# Patient Record
Sex: FEMALE | Race: WHITE
[De-identification: names, ages, dates, MRNs, and addresses within clinical notes are randomized per-mention and may not be internally consistent; named-entity substitution may affect disease eponyms.]

---

## 2018-01-26 ENCOUNTER — HOSPITAL ENCOUNTER (EMERGENCY)
Dept: HOSPITAL 56 - MW.ED | Age: 38
Discharge: HOME | End: 2018-01-26
Payer: COMMERCIAL

## 2018-01-26 VITALS — SYSTOLIC BLOOD PRESSURE: 133 MMHG | DIASTOLIC BLOOD PRESSURE: 85 MMHG

## 2018-01-26 DIAGNOSIS — K02.9: ICD-10-CM

## 2018-01-26 DIAGNOSIS — E03.9: ICD-10-CM

## 2018-01-26 DIAGNOSIS — Z79.899: ICD-10-CM

## 2018-01-26 DIAGNOSIS — F17.210: ICD-10-CM

## 2018-01-26 DIAGNOSIS — J45.909: ICD-10-CM

## 2018-01-26 DIAGNOSIS — K04.7: Primary | ICD-10-CM

## 2018-01-26 PROCEDURE — 99282 EMERGENCY DEPT VISIT SF MDM: CPT

## 2018-01-26 NOTE — EDM.PDOC
ED HPI GENERAL MEDICAL PROBLEM





- General


Chief Complaint: ENT Problem


Stated Complaint: LT LALI OF FACE SWOLLEN


Time Seen by Provider: 01/26/18 14:21


Source of Information: Reports: Patient


History Limitations: Reports: No Limitations





- History of Present Illness


INITIAL COMMENTS - FREE TEXT/NARRATIVE: 


HISTORY AND PHYSICAL:





History of present illness:


Patient is a 37-year-old female who presents to the emergency room with 

complaints of left upper dental pain and soft tissue swelling 3 days. She 

states that she has been taking some leftover amoxicillin for the last 3 days 

and has not improved. She's been using over-the-counter Tylenol and ibuprofen 

without any relief. She is unable to get into a dentist at this time.


Denies any fever, chills, chest pain or shortness of breath. Denies any 

abdominal pain, nausea, vomiting or diarrhea.





Review of systems: 


As per history of present illness and below otherwise all systems reviewed and 

negative.





Past medical history: 


As per history of present illness and as reviewed below otherwise 

noncontributory.





Surgical history: 


As per history of present illness and as reviewed below otherwise 

noncontributory.





Social history: 


No reported history of drug or alcohol abuse.





Family history: 


As per history of present illness and as reviewed below otherwise 

noncontributory.





Physical exam:


Neuro: Well-developed and well-nourished 37-year-old female. Alert and 

oriented. Nontoxic appearing and in no acute distress.


HEENT: Atraumatic, normocephalic, pupils reactive, negative for conjunctival 

pallor or scleral icterus, mucous membranes moist, throat clear without 

erythema or exudate, neck supple, no lymphadenopathy, nontender, trachea 

midline. Dental caries with soft tissue swelling and erythema at #12-13. No 

drainage or exudate noted.


Lungs: Clear to auscultation, breath sounds equal bilaterally.


Heart: S1S2, regular rate and rythm


Abdomen: Soft, nondistended, nontender


Pelvis: Stable nontender.


Genitourinary: Deferred.


Rectal: Deferred.


Extremities: Atraumatic, moves all perself, Neurovascular unremarkable.


Neuro: Awake, alert, oriented. Cranial nerves II through XII unremarkable. 

Cerebellum unremarkable. Motor and sensory unremarkable throughout. Exam 

nonfocal.





We'll prescribe the patient Augmentin 875 twice a day 10 days. Tramadol (#20) 

one tab every or to 6 hours as needed. Follow-up with a dentist. Patient voices 

understanding and is agreeable to plan of care. She denies any further 

questions at this time.





Diagnostics:


[]





Therapeutics:


Hurricane spray/viscous lidocaine (Dental Balls)





Impression: 


Dental abscess


Dental caries





Plan:


1. Please take the antibiotic as prescribed. The medication for pain, Ultram, 

may cause drowsiness so do not take it while driving or needing to be 

functioning at work. You may use the dental balls have been given to you during 

the daytime along with Tylenol/ibuprofen.


2. Please follow-up with the dentist as we discussed.


3. Return to the ED as needed and as discussed.





Definitive disposition and diagnosis as appropriate pending reevaluation and 

review of above.





Duration: Day(s):


Location: Reports: Face


  ** left lower


Pain Score (Numeric/FACES): 7





- Related Data


 Allergies











Allergy/AdvReac Type Severity Reaction Status Date / Time


 


No Known Allergies Allergy   Verified 01/26/18 14:23











Home Meds: 


 Home Meds





Levothyroxine 50 mcg PO DAILY 07/19/15 [History]


Montelukast [Singulair] 10 mg PO DAILY 07/19/15 [History]


metFORMIN [Glucophage] 1,000 mg PO BID 07/19/15 [History]


Prenatal #103/Iron Fumarate/Fa [ Prenatal] 1 tab PO DAILY 05/24/16 [

History]


Albuterol Sulfate 1 unit NEB ASDIRECTED PRN 08/01/17 [History]


Albuterol Sulfate [Proair Hfa] 2 puff INH ASDIRECTED PRN 08/01/17 [History]


Fexofenadine HCl [Allegra Allergy] 1 tab PO ASDIRECTED PRN 08/01/17 [History]


diphenhydrAMINE [Benadryl] 25 ng PO BEDTIME PRN 08/01/17 [History]











Past Medical History


HEENT History: Reports: None


Cardiovascular History: Reports: Heart Murmur


Respiratory History: Reports: Asthma


Gastrointestinal History: Reports: GERD


Genitourinary History: Reports: None


Other Genitourinary History: hx of kidney stone


OB/GYN History: Reports: Polycystic Ovaries


Musculoskeletal History: Reports: Fracture


Other Musculoskeletal History: hx of fx left foot


Neurological History: Reports: None


Psychiatric History: Reports: None


Endocrine/Metabolic History: Reports: Hypothyroidism, Obesity/BMI 30+


Hematologic History: Reports: None


Immunologic History: Reports: None


Oncologic (Cancer) History: Reports: None


Dermatologic History: Reports: None





- Past Surgical History


Head Surgeries/Procedures: Reports: None


Cardiovascular Surgical History: Reports: Other (See Below)


Other Cardiovascular Surgeries/Procedures: angiogram at age 6 months and 5 years


Respiratory Surgical History: Reports: None


Endocrine Surgical History: Reports: None


Neurological Surgical History: Reports: None


Musculoskeletal Surgical History: Reports: None


Oncologic Surgical History: Reports: None


Dermatological Surgical History: Reports: None





Social & Family History





- Tobacco Use


Smoking Status *Q: Current Every Day Smoker


Years of Tobacco use: 15


Packs/Tins Daily: 0.5





- Recreational Drug Use


Recreational Drug Use: No


Drug Use in Last 12 Months: No





- Living Situation & Occupation


Living situation: Reports: with Significant Other





ED ROS ENT





- Review of Systems


Review Of Systems: ROS reveals no pertinent complaints other than HPI.





ED EXAM, ENT





- Physical Exam


Exam: See Below (See dictation)





Course





- Vital Signs


Last Recorded V/S: 





 Last Vital Signs











Temp  97.6 F   01/26/18 14:23


 


Pulse  106 H  01/26/18 14:23


 


Resp  18   01/26/18 14:23


 


BP  133/85   01/26/18 14:23


 


Pulse Ox  97   01/26/18 14:23














Departure





- Departure


Time of Disposition: 14:41


Disposition: Home, Self-Care 01


Clinical Impression: 


 Dental abscess, Dental caries








- Discharge Information


Referrals: 


PCP,None [Primary Care Provider] - 


Additional Instructions: 


My general discharge


The following information is given to patients seen in the emergency department 

who are being discharged to home. This information is to outline your options 

for follow-up care. We provide all patients seen in our emergency department 

with a follow-up referral.





The need for follow-up, as well as the timing and circumstances, are variable 

depending upon the specifics of your emergency department visit.





If you don't have a primary care physician on staff, we will provide you with a 

referral. We always advise you to contact your personal physician following an 

emergency department visit to inform them of the circumstance of the visit and 

for follow-up with them and/or the need for any referrals to a consulting 

specialist.





The emergency department will also refer you to a specialist when appropriate. 

This referral assures that you have the opportunity for follow-up care with a 

specialist. All of these measure are taken in an effort to provide you with 

optimal care, which includes your follow-up.





Under all circumstances we always encourage you to contact your private 

physician who remains a resource for coordinating your care. When calling for 

follow-up care, please make the office aware that this follow-up is from your 

recent emergency room visit. If for any reason you are refused follow-up, 

please contact the [facility] Emergency 

Department at [phone] and asked to speak to the emergency department 

charge nurse.





[facility]


Primary Care


[facility] [place] [id number]


Phone: [phone]


Fax: [phone]





1. Please take the antibiotic as prescribed. The medication for pain, Ultram, 

may cause drowsiness so do not take it while driving or needing to be 

functioning at work. You may use the dental balls have been given to you during 

the daytime along with Tylenol/ibuprofen.


2. Please follow-up with the dentist as we discussed.


3. Return to the ED as needed and as discussed.

## 2018-01-27 ENCOUNTER — HOSPITAL ENCOUNTER (EMERGENCY)
Dept: HOSPITAL 56 - MW.ED | Age: 38
Discharge: HOME | End: 2018-01-27
Payer: COMMERCIAL

## 2018-01-27 VITALS — DIASTOLIC BLOOD PRESSURE: 81 MMHG | SYSTOLIC BLOOD PRESSURE: 126 MMHG

## 2018-01-27 DIAGNOSIS — E03.9: ICD-10-CM

## 2018-01-27 DIAGNOSIS — Z79.899: ICD-10-CM

## 2018-01-27 DIAGNOSIS — K02.9: ICD-10-CM

## 2018-01-27 DIAGNOSIS — F17.210: ICD-10-CM

## 2018-01-27 DIAGNOSIS — Z79.84: ICD-10-CM

## 2018-01-27 DIAGNOSIS — K04.7: Primary | ICD-10-CM

## 2018-01-27 DIAGNOSIS — J45.909: ICD-10-CM

## 2018-01-27 PROCEDURE — 99282 EMERGENCY DEPT VISIT SF MDM: CPT

## 2018-01-27 PROCEDURE — 96372 THER/PROPH/DIAG INJ SC/IM: CPT

## 2018-01-27 NOTE — EDM.PDOC
ED HPI GENERAL MEDICAL PROBLEM





- General


Chief Complaint: ENT Problem


Stated Complaint: L SIDE SWOLLEN FACE


Time Seen by Provider: 01/27/18 11:51





- History of Present Illness


INITIAL COMMENTS - FREE TEXT/NARRATIVE: 





HISTORY AND PHYSICAL:





History of present illness:


Patient is a 37-year-old female was seen yesterday with dental abscess who 

presents now with reevaluation for increased swelling and no fever chills 

vomiting or other concern she was prescribed Augmentin and states she's been 

compliant she's had one dose





Review of systems: 


As per history of present illness and below otherwise all systems reviewed and 

negative.





Past medical history: 


As per history of present illness and as reviewed below otherwise 

noncontributory.





Surgical history: 


As per history of present illness and as reviewed below otherwise 

noncontributory.





Social history: 


No reported history of drug or alcohol abuse.





Family history: 


As per history of present illness and as reviewed below otherwise 

noncontributory.





Physical exam:


HEENT: Atraumatic, normocephalic, pupils reactive, negative for conjunctival 

pallor or scleral icterus, mucous membranes moist, throat clear, neck supple, 

nontender, trachea midline. Patient has multiple dental caries and generally 

poor dentition she also has dental fracture secondary to dental caries she has 

left facial swelling and left submandibular adenopathy noted


Lungs: Clear to auscultation, breath sounds equal bilaterally, chest nontender.


Heart: S1S2, regular, negative for clicks, rubs, or JVD.


Abdomen: Soft, nondistended, nontender. Negative for masses or 

hepatosplenomegaly. Negative for costovertebral tenderness.


Pelvis: Stable nontender.


Genitourinary: Deferred.


Rectal: Deferred.


Extremities: Atraumatic, negative for cords or calf pain. Neurovascular 

unremarkable.


Neuro: Awake, alert, oriented. Cranial nerves II through XII unremarkable. 

Cerebellum unremarkable. Motor and sensory unremarkable throughout. Exam 

nonfocal.





Diagnostics:


None





Therapeutics:


Rocephin 1 g IM





Impression: 


#1 dental abscess





Definitive disposition and diagnosis as appropriate pending reevaluation and 

review of above.





- Related Data


 Allergies











Allergy/AdvReac Type Severity Reaction Status Date / Time


 


No Known Allergies Allergy   Verified 01/26/18 14:23











Home Meds: 


 Home Meds





Levothyroxine 50 mcg PO DAILY 07/19/15 [History]


Montelukast [Singulair] 10 mg PO DAILY 07/19/15 [History]


metFORMIN [Glucophage] 1,000 mg PO BID 07/19/15 [History]


Prenatal #103/Iron Fumarate/Fa [ Prenatal] 1 tab PO DAILY 05/24/16 [

History]


Albuterol Sulfate 1 unit NEB ASDIRECTED PRN 08/01/17 [History]


Albuterol Sulfate [Proair Hfa] 2 puff INH ASDIRECTED PRN 08/01/17 [History]


Fexofenadine HCl [Allegra Allergy] 1 tab PO ASDIRECTED PRN 08/01/17 [History]


diphenhydrAMINE [Benadryl] 25 ng PO BEDTIME PRN 08/01/17 [History]











Past Medical History


HEENT History: Reports: None


Cardiovascular History: Reports: Heart Murmur


Respiratory History: Reports: Asthma


Gastrointestinal History: Reports: GERD


Genitourinary History: Reports: None


Other Genitourinary History: hx of kidney stone


OB/GYN History: Reports: Polycystic Ovaries


Musculoskeletal History: Reports: Fracture


Other Musculoskeletal History: hx of fx left foot


Neurological History: Reports: None


Psychiatric History: Reports: None


Endocrine/Metabolic History: Reports: Hypothyroidism, Obesity/BMI 30+


Hematologic History: Reports: None


Immunologic History: Reports: None


Oncologic (Cancer) History: Reports: None


Dermatologic History: Reports: None





- Past Surgical History


Head Surgeries/Procedures: Reports: None


Cardiovascular Surgical History: Reports: Other (See Below)


Other Cardiovascular Surgeries/Procedures: angiogram at age 6 months and 5 years


Respiratory Surgical History: Reports: None


Endocrine Surgical History: Reports: None


Neurological Surgical History: Reports: None


Musculoskeletal Surgical History: Reports: None


Oncologic Surgical History: Reports: None


Dermatological Surgical History: Reports: None





Social & Family History





- Tobacco Use


Smoking Status *Q: Current Every Day Smoker


Years of Tobacco use: 15


Packs/Tins Daily: 0.5





- Recreational Drug Use


Recreational Drug Use: No


Drug Use in Last 12 Months: No





- Living Situation & Occupation


Living situation: Reports: with Significant Other





ED ROS GENERAL





- Review of Systems


Review Of Systems: ROS reveals no pertinent complaints other than HPI.





ED EXAM, GENERAL





- Physical Exam


Exam: See Below (Dictation)





Course





- Orders/Labs/Meds


Orders: 





 Active Orders 24 hr











 Category Date Time Status


 


 cefTRIAXone [Rocephin] 1,000 mg Med  01/27/18 11:53 Ordered





 Lidocaine 1% [Xylocaine-MPF 1%] 4 ml   





 IM ONETIME   








 Medication Orders





Ceftriaxone Sodium 1,000 mg/ (Lidocaine HCl)  4 mls @ 4 mls/sec IM ONETIME ONE


   Stop: 01/27/18 11:54








Meds: 





Medications











Generic Name Dose Route Start Last Admin





  Trade Name Anita  PRN Reason Stop Dose Admin


 


Ceftriaxone Sodium 1,000 mg/  4 mls @ 4 mls/sec  01/27/18 11:53  





  Lidocaine HCl  IM  01/27/18 11:54  





  ONETIME ONE   














Departure





- Departure


Time of Disposition: 11:55


Disposition: Home, Self-Care 01


Condition: Good


Clinical Impression: 


 Dental abscess, Dental caries








- Discharge Information


Referrals: 


Eloy Landa MD [Primary Care Provider] - 


Additional Instructions: 








The following information is given to patients seen in the emergency department 

who are being discharged to home. This information is to outline your options 

for follow-up care. We provide all patients seen in our emergency department 

with a follow-up referral.





The need for follow-up, as well as the timing and circumstances, are variable 

depending upon the specifics of your emergency department visit.





If you don't have a primary care physician on staff, we will provide you with a 

referral. We always advise you to contact your personal physician following an 

emergency department visit to inform them of the circumstance of the visit and 

for follow-up with them and/or the need for any referrals to a consulting 

specialist.





The emergency department will also refer you to a specialist when appropriate. 

This referral assures that you have the opportunity for followup care with a 

specialist. All of these measure are taken in an effort to provide you with 

optimal care, which includes your followup.





Under all circumstances we always encourage you to contact your private 

physician who remains a resource for coordinating  your care. When calling for 

followup care, please make the office aware that this follow-up is from your 

recent emergency room visit. If for any reason you are refused follow-up, 

please contact the [facility] emergency department at [phone] 

and asked to speak to the emergency department charge nurse.


























Continue Augmentin as prescribed Motrin as directed keep dental appointment as 

discussed return as needed as discussed]





- My Orders


Last 24 Hours: 





My Active Orders





01/27/18 11:53


cefTRIAXone [Rocephin] 1,000 mg   Lidocaine 1% [Xylocaine-MPF 1%] 4 ml IM 

ONETIME 














- Assessment/Plan


Last 24 Hours: 





My Active Orders





01/27/18 11:53


cefTRIAXone [Rocephin] 1,000 mg   Lidocaine 1% [Xylocaine-MPF 1%] 4 ml IM 

ONETIME

## 2021-06-21 NOTE — PCM.OPNOTE
- General Post-Op/Procedure Note


Date of Surgery/Procedure: 06/21/21


Operative Procedure(s): Incision and drainage right breast abscess


Findings: 





2 x 1.5 x 1 cm abscess under the right nipple. Small opening at 12 oclock 

position on nipple


Pre Op Diagnosis: Right breast abscess


Post-Op Diagnosis: same


Anesthesia Technique: General LMA, Local


Primary Surgeon: Liyah Dotson


Condition: Stable

## 2021-06-21 NOTE — [id number]
Post Anesthesia Note





- EVALUATION WITHIN 48HRS OF ANESTHETIC


Vital Signs in Normal Range: Yes


Patient Participated in Evaluation: Yes


Respiratory Function Stable: Yes


Airway Patent: Yes


Cardiovascular Function Stable: Yes


Hydration Status Stable: Yes


Pain Control Satisfactory: Yes


Nausea and Vomiting Control Satisfactory: Yes


Mental Status Recovered: Yes


Vital Signs: 


                                Last Vital Signs











Temp  96.4 F L  06/21/21 11:50


 


Pulse  93   06/21/21 12:57


 


Resp  12   06/21/21 12:57


 


BP  107/76   06/21/21 12:57


 


Pulse Ox  93 L  06/21/21 12:57

## 2021-06-21 NOTE — US
CLINICAL HISTORY: :



Increased pain and erythema in the right breast. Patient had diagnostic 

mammogram and ultrasound evaluation 06/18/2021.



COMPARISON:



06/18/2021



TECHNIQUE:



Real-time ultrasound imaging of the right breast with imaging 

documentation.  Scanning was performed by the technologist. The radiologist 

was not present for imaging. 



FINDINGS:



Target ultrasound in the area of symptomatology adjacent to the right 

nipple demonstrates an irregular heterogeneous fluid collection located 

superficially adjacent to the nipple measuring 2.1 x 1.1 x 1.4 cm. This is 

avascular. There is adjacent hyperemia. There does appear to be overlying 

skin thickening.



No definite solid mass identified. 



IMPRESSION:



 Findings are suspicious for a small superficial breast abscess. This 

appears increased from prior ultrasound. 



RECOMMENDATIONS:



1. Recommend clinical follow-up of probable right breast abscess. 

Ultrasound-guided needle aspiration should be considered. 



2. Recommend follow-up ultrasound in 4-6 weeks after treatment and symptom 

resolution to document resolution of ultrasound findings. 



BI-RADS:  3: Probably benign.



Dictated by Corky Dietz MD @ 6/21/2021 10:03:37 AM



(Electronically Signed)

## 2021-06-21 NOTE — EDM.PDOC
ED HPI GENERAL MEDICAL PROBLEM





- General


Chief Complaint: Skin Complaint


Stated Complaint: MASS IN RIGHT BREAST, INFECTION


Time Seen by Provider: 06/21/21 07:10





- History of Present Illness


INITIAL COMMENTS - FREE TEXT/NARRATIVE: 





CHIEF COMPLAINT(S): Right breast pain








HISTORY OF PRESENT ILLNESS: This is a 41-year-old woman with a past medical 

history of asthma who comes to the emergency department with a chief complaint 

of right breast pain.  The patient states that approximately 4 days ago she saw 

her primary care physician and they obtained an ultrasound and mammogram and 

told her that she had an infection.  They prescribed her Bactrim.  She states 

that however over that time she feels like the area is becoming more inflamed it

is tender and throbbing rated 9 out of 10.  She states there is mild redness of 

the skin.  She states that the antibiotics do not seem to be helping.  She 

denies any fever, chills, chest pain, shortness of breath, abdominal pain, 

nausea or vomiting.  She states that her mammogram was normal.  She has not had 

the Covid vaccine and does not know if she has had any Covid exposures.  She 

does not have any Covid symptoms.  She denies any nipple drainage.  She denies 

any family history of breast cancer.  She states that she has been trying to ice

the area which has not helped the pain.  She also tried diclofenac with some 

mild improvement.  Aggravating symptoms include touching it.  Otherwise she 

denies any other symptoms





 


REVIEW OF SYSTEMS: 





Constitutional: Denies fever, chills.


Eyes: Denies eye pain


Ears, Nose, Mouth, & Throat: Denies earache


Chest: Right breast pain


Cardiovascular: Denies chest pain


Respiratory: Denies shortness of breath


Gastrointestinal: Denies Nausea, vomiting, diarrhea, hematochezia.


Genitourinary: Denies hematuria


Skin:Denies a rash


MSK: Denies joint pain


Neurological: Denies blurred vision


Psychiatric: Denies depression








PAST MEDICAL HISTORY: As per history of present illness and as reviewed below 

otherwise noncontributory.





SURGICAL HISTORY: As per history of present illness and as reviewed below 

otherwise noncontributory.








SOCIAL HISTORY: As per history of present illness and as reviewed below 

otherwise noncontributory.





FAMILY HISTORY: As per history of present illness and as reviewed below 

otherwise noncontributory.








EXAMINATION OF ORGAN SYSTEMS/BODY AREAS: 





Constitutional: Blood pressure is 144/99, heart rate 104, respiratory rate 18 

with an oxygen saturation 96% on room air.  Temperature 36.0


General: Overall well-appearing woman who is in no acute distress


Psychiatric: Appropriate mood and affect.


Eyes: No scleral icterus or conjunctival erythema


ENMT: Moist mucous membranes. No pharyngeal erythema


Breast: RN marquise Cristela was not present.  Right breast has an area 

subareolar that is swollen and quite large.  This area is quite tender.  There 

is overlying erythema.  There is some retraction of the nipple.


Cardiovascular: Regular, rate, and rhythm.  No gallops, murmurs, or rubs.  

Bilateral upper extremity pulses symmetric and intact.  No peripheral edema.  No

JVD.


Respiratory: Lungs clear to auscultation bilaterally.  No wheezes, rales, or 

rhonchi.


Gastrointestinal: Soft, non-tender, non-distended.  Normoactive bowel sounds


Genitourinary: No suprapubic tenderness


Musculoskeletal: Normal range of motion.


Skin: No lesions or abrasions.


Neurological:     Alert, GCS 15








MEDICAL DECISION MAKING AND COURSE IN THE ED WITH INTERPRETATION/REVIEW OF 

DIAGNOSTIC STUDIES: This is a 40-year-old woman with a past medical history of 

tobacco use disorder and asthma who comes to the emergency department with 

worsening right fascia pain with evidence of a subareolar mass with minimal 

fluctuance which is tender to palpation with overlying skin changes.  At this 

time the patient's temperature is 36 and her heart rate is 104.  Will obtain a 

septic work-up.  We will provide the patient with 1 L of lactated Ringer's bolus

and await lactic acid for further fluid administration.  At this time 

antibiotics are going to be held as I do believe this is secondary to a abscess.

 We will provide the patient with 4 mg of IV morphine for pain relief.  Will 

obtain a breast ultrasound.





Laboratory: CBC reveals elevated hemoglobin at 6.2 and hematocrit of 46.6 

otherwise unremarkable.  INR is normal.  CMP reveals hyperglycemia otherwise 

unremarkable.  Lactic acid is 1.0.  hCG is negative.  Covid is negative.





After labs the patient does not meet sepsis criteria therefore no further fluid 

bolus will be administered.  The patient's heart rate did improve.  We will hold

off on antibiotic administration again as I do believe this is an abscess and 

treatment for an abscess is incision and drainage.





The radiological images were viewed by myself along with reading the report from

the radiologist.


Breast ultrasound reveals a 2.1 x 1.1 x 1.4 cm abscess just adjacent to the 

right nipple.





After imaging I did discuss results with the patient.  I did contact Dr. Alvarado 

who did come and evaluate the patient.  She recommends incision and drainage in 

the operating room.  Therefore the patient was started on maintenance fluids.  I

did provide the patient with additional pain medications including 1 mg of 

Dilaudid.





DISPOSITION: The patient was transferred to the operating room for incision and 

drainage 





CONDITION: Fair





PROCEDURES: None





FINAL IMPRESSION(S)/DIAGNOSES: 





1.  Acute right breast abscess





 





Farrukh Robison M.D.


  ** right breast


Pain Score (Numeric/FACES): 9





- Related Data


                                    Allergies











Allergy/AdvReac Type Severity Reaction Status Date / Time


 


No Known Allergies Allergy   Verified 06/21/21 06:58











Home Meds: 


                                    Home Meds





Montelukast [Singulair] 10 mg PO DAILY 07/19/15 [History]


metFORMIN [Glucophage] 1,000 mg PO BID 07/19/15 [History]


Albuterol Sulfate [Proair Hfa] 2 puff INH ASDIRECTED PRN 08/01/17 [History]


Fexofenadine HCl [Allegra Allergy] 1 tab PO ASDIRECTED PRN 08/01/17 [History]


diphenhydrAMINE [Benadryl] 25 ng PO BEDTIME PRN 08/01/17 [History]


cefUROXime axetiL [Ceftin] 250 mg PO BID #20 tablet 11/16/18 [Rx]


methylPREDNISolone [Medrol] 4 mg PO ASDIRECTED #1 dosepk 11/16/18 [Rx]











Past Medical History


HEENT History: Reports: None


Cardiovascular History: Reports: Heart Murmur


Respiratory History: Reports: Asthma


Gastrointestinal History: Reports: GERD


Genitourinary History: Reports: None, Renal Calculus


Other Genitourinary History: hx of kidney stone


OB/GYN History: Reports: Polycystic Ovaries, Other (See Below)


Other OB/GYN History: tubal pregnancy


Musculoskeletal History: Reports: Back Pain, Chronic, Fracture, Other (See 

Below)


Other Musculoskeletal History: hx of fx left foot; Scheuermann's Disease


Neurological History: Reports: None


Psychiatric History: Reports: None


Endocrine/Metabolic History: Reports: Hypothyroidism, Obesity/BMI 30+


Insulin Pump Model and : None


Hematologic History: Reports: None


Immunologic History: Reports: None


Oncologic (Cancer) History: Reports: None


Dermatologic History: Reports: None





- Infectious Disease History


Infectious Disease History: Reports: None





- Past Surgical History


Head Surgeries/Procedures: Reports: None


HEENT Surgical History: Reports: Tonsillectomy


Cardiovascular Surgical History: Reports: Other (See Below)


Other Cardiovascular Surgeries/Procedures: Angiogram


Respiratory Surgical History: Reports: None


GI Surgical History: Reports: None


Female  Surgical History: Reports: D&C


Endocrine Surgical History: Reports: None


Neurological Surgical History: Reports: None


Musculoskeletal Surgical History: Reports: None


Oncologic Surgical History: Reports: None


Dermatological Surgical History: Reports: None





Social & Family History





- Family History


Family Medical History: No Pertinent Family History





- Caffeine Use


Caffeine Use: Reports: Coffee





- Recreational Drug Use


Recreational Drug Use: No





- Living Situation & Occupation


Living situation: Reports: with Significant Other





ED ROS GENERAL





- Review of Systems


Review Of Systems: See Below





ED EXAM, SKIN/RASH


Exam: See Below





Course





- Vital Signs


Last Recorded V/S: 


                                Last Vital Signs











Temp  36.0 C L  06/21/21 06:55


 


Pulse  92   06/21/21 11:33


 


Resp  16   06/21/21 11:33


 


BP  125/88   06/21/21 11:33


 


Pulse Ox  95   06/21/21 11:33














- Orders/Labs/Meds


Orders: 


                               Active Orders 24 hr











 Category Date Time Status


 


 Admission Status [Patient Status] [ADT] Stat ADT  06/21/21 11:17 Active


 


 CULTURE BLOOD [BC] Stat Lab  06/21/21 07:27 Received


 


 CULTURE BLOOD [BC] Stat Lab  06/21/21 07:57 Received


 


 Lactated Ringers [Ringers, Lactated] 1,000 ml Med  06/21/21 11:30 Active





 IV ASDIRECTED   


 


 Blood Culture x2 Reflex Set [OM.PC] Stat Oth  06/21/21 07:20 Ordered








                                Medication Orders





Lactated Ringer's (Ringers, Lactated)  1,000 mls @ 150 mls/hr IV ASDIRECTED CHANNING


   Last Admin: 06/21/21 11:29  Dose: 150 mls/hr


   Documented by: JUSTINO








Labs: 


                                Laboratory Tests











  06/21/21 06/21/21 06/21/21 Range/Units





  07:27 07:27 07:27 


 


WBC  10.41    (4.0-11.0)  K/uL


 


RBC  5.13    (4.30-5.90)  M/uL


 


Hgb  16.2 H    (12.0-16.0)  g/dL


 


Hct  46.6 H    (36.0-46.0)  %


 


MCV  90.8    (80.0-98.0)  fL


 


MCH  31.6    (27.0-32.0)  pg


 


MCHC  34.8    (31.0-37.0)  g/dL


 


RDW Std Deviation  43.6    (28.0-62.0)  fl


 


RDW Coeff of Ricarda  13    (11.0-15.0)  %


 


Plt Count  268    (150-400)  K/uL


 


MPV  9.00    (7.40-12.00)  fL


 


Neut % (Auto)  67.4    (48.0-80.0)  %


 


Lymph % (Auto)  19.3    (16.0-40.0)  %


 


Mono % (Auto)  7.4    (0.0-15.0)  %


 


Eos % (Auto)  5.6    (0.0-7.0)  %


 


Baso % (Auto)  0.3    (0.0-1.5)  %


 


Neut # (Auto)  7.0 H    (1.4-5.7)  K/uL


 


Lymph # (Auto)  2.0    (0.6-2.4)  K/uL


 


Mono # (Auto)  0.8    (0.0-0.8)  K/uL


 


Eos # (Auto)  0.6    (0.0-0.7)  K/uL


 


Baso # (Auto)  0.0    (0.0-0.1)  K/uL


 


Nucleated RBC %  0.0    /100WBC


 


Nucleated RBCs #  0    K/uL


 


INR   1.00   


 


Sodium    137  (136-145)  mmol/L


 


Potassium    4.4  (3.5-5.1)  mmol/L


 


Chloride    102  ()  mmol/L


 


Carbon Dioxide    24.1  (21.0-32.0)  mmol/L


 


BUN    16  (7.0-18.0)  mg/dL


 


Creatinine    0.9  (0.6-1.0)  mg/dL


 


Est Cr Clr Drug Dosing    72.53  mL/min


 


Estimated GFR (MDRD)    > 60.0  ml/min


 


Glucose    112 H  ()  mg/dL


 


Lactic Acid     (0.4-2.0)  mmol/L


 


Calcium    8.9  (8.5-10.1)  mg/dL


 


Magnesium    2.1  (1.8-2.4)  mg/dL


 


Total Bilirubin    0.5  (0.2-1.0)  mg/dL


 


AST    11 L  (15-37)  IU/L


 


ALT    21  (14-63)  IU/L


 


Alkaline Phosphatase    57  ()  U/L


 


Total Protein    7.6  (6.4-8.2)  g/dL


 


Albumin    3.7  (3.4-5.0)  g/dL


 


Globulin    3.9  (2.6-4.0)  g/dL


 


Albumin/Globulin Ratio    0.9  (0.9-1.6)  


 


HCG, Qual     (NEG)  


 


SARS-CoV-2 RNA (MARY)     (NEGATIVE)  














  06/21/21 06/21/21 06/21/21 Range/Units





  07:27 07:27 07:40 


 


WBC     (4.0-11.0)  K/uL


 


RBC     (4.30-5.90)  M/uL


 


Hgb     (12.0-16.0)  g/dL


 


Hct     (36.0-46.0)  %


 


MCV     (80.0-98.0)  fL


 


MCH     (27.0-32.0)  pg


 


MCHC     (31.0-37.0)  g/dL


 


RDW Std Deviation     (28.0-62.0)  fl


 


RDW Coeff of Ricarda     (11.0-15.0)  %


 


Plt Count     (150-400)  K/uL


 


MPV     (7.40-12.00)  fL


 


Neut % (Auto)     (48.0-80.0)  %


 


Lymph % (Auto)     (16.0-40.0)  %


 


Mono % (Auto)     (0.0-15.0)  %


 


Eos % (Auto)     (0.0-7.0)  %


 


Baso % (Auto)     (0.0-1.5)  %


 


Neut # (Auto)     (1.4-5.7)  K/uL


 


Lymph # (Auto)     (0.6-2.4)  K/uL


 


Mono # (Auto)     (0.0-0.8)  K/uL


 


Eos # (Auto)     (0.0-0.7)  K/uL


 


Baso # (Auto)     (0.0-0.1)  K/uL


 


Nucleated RBC %     /100WBC


 


Nucleated RBCs #     K/uL


 


INR     


 


Sodium     (136-145)  mmol/L


 


Potassium     (3.5-5.1)  mmol/L


 


Chloride     ()  mmol/L


 


Carbon Dioxide     (21.0-32.0)  mmol/L


 


BUN     (7.0-18.0)  mg/dL


 


Creatinine     (0.6-1.0)  mg/dL


 


Est Cr Clr Drug Dosing     mL/min


 


Estimated GFR (MDRD)     ml/min


 


Glucose     ()  mg/dL


 


Lactic Acid  1.0    (0.4-2.0)  mmol/L


 


Calcium     (8.5-10.1)  mg/dL


 


Magnesium     (1.8-2.4)  mg/dL


 


Total Bilirubin     (0.2-1.0)  mg/dL


 


AST     (15-37)  IU/L


 


ALT     (14-63)  IU/L


 


Alkaline Phosphatase     ()  U/L


 


Total Protein     (6.4-8.2)  g/dL


 


Albumin     (3.4-5.0)  g/dL


 


Globulin     (2.6-4.0)  g/dL


 


Albumin/Globulin Ratio     (0.9-1.6)  


 


HCG, Qual   NEGATIVE   (NEG)  


 


SARS-CoV-2 RNA (MARY)    NEGATIVE  (NEGATIVE)  











Meds: 


Medications











Generic Name Dose Route Start Last Admin





  Trade Name Freq  PRN Reason Stop Dose Admin


 


Lactated Ringer's  1,000 mls @ 150 mls/hr  06/21/21 11:30  06/21/21 11:29





  Ringers, Lactated  IV   150 mls/hr





  ASDIRECTED CHANNING   Administration














Discontinued Medications














Generic Name Dose Route Start Last Admin





  Trade Name Jasonq  PRN Reason Stop Dose Admin


 


Hydromorphone HCl  1 mg  06/21/21 09:50  06/21/21 09:54





  Hydromorphone 1 Mg/Ml Syringe  IVPUSH  06/21/21 09:51  1 mg





  ONETIME ONE   Administration


 


Lactated Ringer's  1,000 mls @ 999 mls/hr  06/21/21 07:19  06/21/21 07:37





  Ringers, Lactated  IV  06/21/21 08:19  999 mls/hr





  .BOLUS ONE   Administration


 


Morphine Sulfate  4 mg  06/21/21 07:19  06/21/21 07:37





  Morphine 4 Mg/Ml Syringe  IVPUSH  06/21/21 07:20  4 mg





  ONETIME ONE   Administration














Departure





- Departure


Time of Disposition: 11:17


Disposition: Still A Patient 30


Condition: Fair


Clinical Impression: 


 Breast abscess








- Discharge Information


Referrals: 


Zahra Booth DO [Primary Care Provider] - 


Forms:  ED Department Discharge





Sepsis Event Note (ED)





- Evaluation


Sepsis Screening Result: No Definite Risk





- Focused Exam


Vital Signs: 


                                   Vital Signs











  Temp Pulse Resp BP Pulse Ox


 


 06/21/21 11:33   92  16  125/88  95


 


 06/21/21 09:57   86  16  146/99 H  98


 


 06/21/21 06:55  36.0 C L  104 H  18  144/99 H  96














- My Orders


Last 24 Hours: 


My Active Orders





06/21/21 07:20


Blood Culture x2 Reflex Set [OM.PC] Stat 





06/21/21 07:27


CULTURE BLOOD [BC] Stat 





06/21/21 07:57


CULTURE BLOOD [BC] Stat 





06/21/21 11:17


Admission Status [Patient Status] [ADT] Stat 





06/21/21 11:30


Lactated Ringers [Ringers, Lactated] 1,000 ml IV ASDIRECTED 














- Assessment/Plan


Last 24 Hours: 


My Active Orders





06/21/21 07:20


Blood Culture x2 Reflex Set [OM.PC] Stat 





06/21/21 07:27


CULTURE BLOOD [BC] Stat 





06/21/21 07:57


CULTURE BLOOD [BC] Stat 





06/21/21 11:17


Admission Status [Patient Status] [ADT] Stat 





06/21/21 11:30


Lactated Ringers [Ringers, Lactated] 1,000 ml IV ASDIRECTED

## 2021-06-21 NOTE — PCM.POSTAN
POST ANESTHESIA ASSESSMENT





- MENTAL STATUS


Mental Status: Alert, Oriented





- VITAL SIGNS


Vital Signs: 


                                Last Vital Signs











Temp  96.4 F L  06/21/21 11:50


 


Pulse  93   06/21/21 12:57


 


Resp  12   06/21/21 12:57


 


BP  107/76   06/21/21 12:57


 


Pulse Ox  93 L  06/21/21 12:57














- RESPIRATORY


Respiratory Status: Respiratory Rate WNL, Airway Patent, O2 Saturation Stable





- CARDIOVASCULAR


CV Status: Pulse Rate WNL, Blood Pressure Stable





- GASTROINTESTINAL


GI Status: No Symptoms





- POST OP HYDRATION


Hydration Status: Adequate & Stable

## 2021-06-21 NOTE — OR
SURGEON:

LIYAH DOTSON MD

 

DATE OF PROCEDURE:  06/21/2021

 

PREOPERATIVE DIAGNOSIS:

Right breast abscess.

 

POSTOPERATIVE DIAGNOSIS:

Right breast abscess.

 

PROCEDURE PERFORMED:

Right breast abscess incision and drainage.

 

PRIMARY SURGEON:

Liyah Dotson MD

 

ANESTHESIA:

General LMA, local.

 

FLUID:

700 mL of crystalloid.

 

ESTIMATED BLOOD LOSS:

10 mL.

 

FINDINGS:

2 x 1.5 x 1 cm abscess immediately below the nipple.

 

COMPLICATIONS:

None.

 

INDICATIONS:

The patient is a 41-year-old female who presented to the emergency room today

with increasing right breast pain.  She went to see her primary care physician

on Friday and was diagnosed with cellulitis and started on antibiotics.  Over

the last 24 hours, she has had increasing swelling, pain, and erythema

underneath her right nipple.  An ultrasound performed in the emergency room

showed a retro-nipple abscess measuring approximately 2 x 1 x 1 cm in size.  I

explained to the patient the need to go to the operating room to incise and

drain this.  I explained the procedure, expected perioperative course, and the

risks.  She verbalized understanding and wishes to proceed.

 

PROCEDURE IN DETAIL:

The patient was brought into the OR and placed on the OR table in supine

position.  A time-out was completed verifying the patient's name, age, date of

birth, allergies, and procedure to be performed.  General LMA anesthesia was

induced.  The right chest wall and breast were prepped and draped in usual

standard fashion.  I anesthetized the skin around the nipple with 10 mL of 0.5%

Marcaine plain.  On inspection, the patient had a small opening at the 12

o'clock position of the nipple.  This was draining a small amount of purulent

fluid.  I took a 15 blade and made a small incision over the top of this.

Purulent material was expressed.  This was sent for culture.  I then took the 15

blade and made an incision from the 11 to 1 o'clock position along the skin-

areolar border.  Electrocautery was used to dissect down to level of the breast

tissue itself.  I then lifted the areola using a Alesia retractor and dissected

towards the nipple.  Hemostasis was achieved using electrocautery.  Once I got

underneath the nipple, I entered an abscess cavity.  This abscess cavity

measured 2 x 1.5 x 1 cm in size.  Purulent material was expressed.  Using a

hemostat, I bluntly dissected open the abscess cavity and took down any

loculations.  The wound was then irrigated with copious amounts of normal

saline.  I then reinspected my wound.  Any further bleeding was controlled with

electrocautery.  The wound was then packed with 1 inch iodoform packing strip

and covered with dry 4 x 4 dressings, which were secured in place with tape.

All counts were complete and correct at the end of the case.  The patient was

extubated and taken to PACU in stable condition.

 

 

JENNIFER / SHILPA

DD:  06/21/2021 14:17:16

DT:  06/21/2021 15:11:22

Job #:  [id number]

## 2021-06-21 NOTE — PCM.HP.2
H&P History of Present Illness





- General


Date of Service: 06/21/21


Admit Problem/Dx: 


                           Admission Diagnosis/Problem





Admission Diagnosis/Problem      Abscess of breast








Source of Information: Patient


History Limitations: Reports: No Limitations





- History of Present Illness


Initial Comments - Free Text/Narative: 


Patient is a 41 year old female smoker, history of asthma, who presents with 

pain and tenderness around her right nipple. She has a history of nipple 

piercing, but took these out 6 years ago. She states that intermittently she 

will have some discharge from her nipples, so she regularly squeezes her nipples

to express them. Last week she noticed redness and pain around the right nipple.

She saw a PCP who placed her on Bactrim on Friday over the weekend she had 

increasing pain behind her right nipple and last night she couldn't sleep 

because it was so tender. She presented to the ER this am. She was slightly 

tachycardic (110) but her vitals were normal. Her WBC was normal. US of the 

right breast showed a 2.1 x 1.1 x 1.4 collection of fluid behind the right 

nipple consistent with an abscess 


  ** right breast


Pain Score (Numeric/FACES): 9





- Related Data


Allergies/Adverse Reactions: 


                                    Allergies











Allergy/AdvReac Type Severity Reaction Status Date / Time


 


No Known Allergies Allergy   Verified 06/21/21 06:58











Home Medications: 


                                    Home Meds





Montelukast [Singulair] 10 mg PO DAILY 07/19/15 [History]


metFORMIN [Glucophage] 1,000 mg PO BID 07/19/15 [History]


Albuterol Sulfate [Proair Hfa] 2 puff INH ASDIRECTED PRN 08/01/17 [History]


Fexofenadine HCl [Allegra Allergy] 1 tab PO ASDIRECTED PRN 08/01/17 [History]


diphenhydrAMINE [Benadryl] 25 ng PO BEDTIME PRN 08/01/17 [History]


cefUROXime axetiL [Ceftin] 250 mg PO BID #20 tablet 11/16/18 [Rx]


methylPREDNISolone [Medrol] 4 mg PO ASDIRECTED #1 dosepk 11/16/18 [Rx]











Past Medical History


HEENT History: Reports: None


Cardiovascular History: Reports: Heart Murmur


Respiratory History: Reports: Asthma


Gastrointestinal History: Reports: GERD


Genitourinary History: Reports: None, Renal Calculus


Other Genitourinary History: hx of kidney stone


OB/GYN History: Reports: Polycystic Ovaries, Other (See Below)


Other OB/BYN History: tubal pregnancy


Musculoskeletal History: Reports: Back Pain, Chronic, Fracture, Other (See 

Below)


Other Musculoskeletal History: hx of fx left foot; Scheuermann's Disease


Neurological History: Reports: None


Psychiatric History: Reports: None


Endocrine/Metabolic History: Reports: Hypothyroidism, Obesity/BMI 30+


Insulin Pump Model and : None


Hematologic History: Reports: None


Immunologic History: Reports: None


Oncologic (Cancer) History: Reports: None


Dermatologic History: Reports: None





- Infectious Disease History


Infectious Disease History: Reports: None





- Past Surgical History


Head Surgeries/Procedures: Reports: None


HEENT Surgical History: Reports: Tonsillectomy


Cardiovascular Surgical History: Reports: Other (See Below)


Other Cardiovascular Surgeries/Procedures: Angiogram


Respiratory Surgical History: Reports: None


GI Surgical History: Reports: None


Female  Surgical History: Reports: D&C


Endocrine Surgical History: Reports: None


Neurological Surgical History: Reports: None


Musculoskeletal Surgical History: Reports: None


Oncologic Surgical History: Reports: None


Dermatological Surgical History: Reports: None





Social & Family History





- Family History


Family Medical History: No Pertinent Family History





- Caffeine Use


Caffeine Use: Reports: Coffee





- Recreational Drug Use


Recreational Drug Use: No





- Living Situation & Occupation


Living situation: Reports: with Significant Other





H&P Review of Systems





- Review of Systems:


Review Of Systems: See Below


General: Reports: Malaise, Decreased Appetite.  Denies: Fever, Chills


HEENT: Reports: No Symptoms


Pulmonary: Reports: No Symptoms


Cardiovascular: Reports: No Symptoms


Gastrointestinal: Reports: No Symptoms


Skin: Reports: Change in Color, Lumps, Other (pain in right nipple )





Exam





- Exam


Exam: See Below





- Vital Signs


Vital Signs: 


                                Last Vital Signs











Temp  36.0 C L  06/21/21 06:55


 


Pulse  92   06/21/21 11:33


 


Resp  16   06/21/21 11:33


 


BP  125/88   06/21/21 11:33


 


Pulse Ox  95   06/21/21 11:33











Weight: 99.79 kg





- Exam


Quality Assessment: Supplemental Oxygen


General: Alert, Oriented


HEENT: Conjunctiva Clear, Mucosa Moist & Pink, Posterior Pharynx Clear


Neck: Supple


Lungs: Clear to Auscultation, Normal Respiratory Effort


Cardiovascular: Regular Rate, Regular Rhythm


GI/Abdominal Exam: Soft


Skin: Other (firm tender erythematous right nipple with distortion of the normal

 nipple architecture. No drainage)





- Patient Data


Lab Results Last 24 hrs: 


                         Laboratory Results - last 24 hr











  06/21/21 06/21/21 06/21/21 Range/Units





  07:27 07:27 07:27 


 


WBC  10.41    (4.0-11.0)  K/uL


 


RBC  5.13    (4.30-5.90)  M/uL


 


Hgb  16.2 H    (12.0-16.0)  g/dL


 


Hct  46.6 H    (36.0-46.0)  %


 


MCV  90.8    (80.0-98.0)  fL


 


MCH  31.6    (27.0-32.0)  pg


 


MCHC  34.8    (31.0-37.0)  g/dL


 


RDW Std Deviation  43.6    (28.0-62.0)  fl


 


RDW Coeff of Ricarda  13    (11.0-15.0)  %


 


Plt Count  268    (150-400)  K/uL


 


MPV  9.00    (7.40-12.00)  fL


 


Neut % (Auto)  67.4    (48.0-80.0)  %


 


Lymph % (Auto)  19.3    (16.0-40.0)  %


 


Mono % (Auto)  7.4    (0.0-15.0)  %


 


Eos % (Auto)  5.6    (0.0-7.0)  %


 


Baso % (Auto)  0.3    (0.0-1.5)  %


 


Neut # (Auto)  7.0 H    (1.4-5.7)  K/uL


 


Lymph # (Auto)  2.0    (0.6-2.4)  K/uL


 


Mono # (Auto)  0.8    (0.0-0.8)  K/uL


 


Eos # (Auto)  0.6    (0.0-0.7)  K/uL


 


Baso # (Auto)  0.0    (0.0-0.1)  K/uL


 


Nucleated RBC %  0.0    /100WBC


 


Nucleated RBCs #  0    K/uL


 


INR   1.00   


 


Sodium    137  (136-145)  mmol/L


 


Potassium    4.4  (3.5-5.1)  mmol/L


 


Chloride    102  ()  mmol/L


 


Carbon Dioxide    24.1  (21.0-32.0)  mmol/L


 


BUN    16  (7.0-18.0)  mg/dL


 


Creatinine    0.9  (0.6-1.0)  mg/dL


 


Est Cr Clr Drug Dosing    72.53  mL/min


 


Estimated GFR (MDRD)    > 60.0  ml/min


 


Glucose    112 H  ()  mg/dL


 


Lactic Acid     (0.4-2.0)  mmol/L


 


Calcium    8.9  (8.5-10.1)  mg/dL


 


Magnesium    2.1  (1.8-2.4)  mg/dL


 


Total Bilirubin    0.5  (0.2-1.0)  mg/dL


 


AST    11 L  (15-37)  IU/L


 


ALT    21  (14-63)  IU/L


 


Alkaline Phosphatase    57  ()  U/L


 


Total Protein    7.6  (6.4-8.2)  g/dL


 


Albumin    3.7  (3.4-5.0)  g/dL


 


Globulin    3.9  (2.6-4.0)  g/dL


 


Albumin/Globulin Ratio    0.9  (0.9-1.6)  


 


HCG, Qual     (NEG)  


 


SARS-CoV-2 RNA (MARY)     (NEGATIVE)  














  06/21/21 06/21/21 06/21/21 Range/Units





  07:27 07:27 07:40 


 


WBC     (4.0-11.0)  K/uL


 


RBC     (4.30-5.90)  M/uL


 


Hgb     (12.0-16.0)  g/dL


 


Hct     (36.0-46.0)  %


 


MCV     (80.0-98.0)  fL


 


MCH     (27.0-32.0)  pg


 


MCHC     (31.0-37.0)  g/dL


 


RDW Std Deviation     (28.0-62.0)  fl


 


RDW Coeff of Ricarda     (11.0-15.0)  %


 


Plt Count     (150-400)  K/uL


 


MPV     (7.40-12.00)  fL


 


Neut % (Auto)     (48.0-80.0)  %


 


Lymph % (Auto)     (16.0-40.0)  %


 


Mono % (Auto)     (0.0-15.0)  %


 


Eos % (Auto)     (0.0-7.0)  %


 


Baso % (Auto)     (0.0-1.5)  %


 


Neut # (Auto)     (1.4-5.7)  K/uL


 


Lymph # (Auto)     (0.6-2.4)  K/uL


 


Mono # (Auto)     (0.0-0.8)  K/uL


 


Eos # (Auto)     (0.0-0.7)  K/uL


 


Baso # (Auto)     (0.0-0.1)  K/uL


 


Nucleated RBC %     /100WBC


 


Nucleated RBCs #     K/uL


 


INR     


 


Sodium     (136-145)  mmol/L


 


Potassium     (3.5-5.1)  mmol/L


 


Chloride     ()  mmol/L


 


Carbon Dioxide     (21.0-32.0)  mmol/L


 


BUN     (7.0-18.0)  mg/dL


 


Creatinine     (0.6-1.0)  mg/dL


 


Est Cr Clr Drug Dosing     mL/min


 


Estimated GFR (MDRD)     ml/min


 


Glucose     ()  mg/dL


 


Lactic Acid  1.0    (0.4-2.0)  mmol/L


 


Calcium     (8.5-10.1)  mg/dL


 


Magnesium     (1.8-2.4)  mg/dL


 


Total Bilirubin     (0.2-1.0)  mg/dL


 


AST     (15-37)  IU/L


 


ALT     (14-63)  IU/L


 


Alkaline Phosphatase     ()  U/L


 


Total Protein     (6.4-8.2)  g/dL


 


Albumin     (3.4-5.0)  g/dL


 


Globulin     (2.6-4.0)  g/dL


 


Albumin/Globulin Ratio     (0.9-1.6)  


 


HCG, Qual   NEGATIVE   (NEG)  


 


SARS-CoV-2 RNA (MARY)    NEGATIVE  (NEGATIVE)  











Result Diagrams: 


                                 06/21/21 07:27





                                 06/21/21 07:27





Sepsis Event Note





- Evaluation


Sepsis Screening Result: No Definite Risk





- Focused Exam


Vital Signs: 


                                   Vital Signs











  Temp Pulse Resp BP Pulse Ox


 


 06/21/21 11:33   92  16  125/88  95


 


 06/21/21 09:57   86  16  146/99 H  98


 


 06/21/21 06:55  36.0 C L  104 H  18  144/99 H  96














- Problem List


(1) Breast abscess


SNOMED Code(s): 90880707


   ICD Code: N61.1 - ABSCESS OF THE BREAST AND NIPPLE   Status: Acute   Current 

Visit: Yes   


Problem List Initiated/Reviewed/Updated: Yes


Orders Last 24hrs: 


                               Active Orders 24 hr











 Category Date Time Status


 


 Admission Status [Patient Status] [ADT] Stat ADT  06/21/21 11:17 Active


 


 CULTURE BLOOD [BC] Stat Lab  06/21/21 07:27 Received


 


 CULTURE BLOOD [BC] Stat Lab  06/21/21 07:57 Received


 


 Lactated Ringers [Ringers, Lactated] 1,000 ml Med  06/21/21 11:30 Active





 IV ASDIRECTED   


 


 Blood Culture x2 Reflex Set [OM.PC] Stat Oth  06/21/21 07:20 Ordered








                                Medication Orders





Lactated Ringer's (Ringers, Lactated)  1,000 mls @ 150 mls/hr IV ASDIRECTED CHANNING


   Last Admin: 06/21/21 11:29  Dose: 150 mls/hr


   Documented by: JUSTINO








Assessment/Plan Comment:: 


The patient has a right retroareolar breast abscess. This is likely due to 

periductal inflammation secondary to smoking as well as the trauma to the nipple

 caused by her trying to express the nipples. It may also be due to the history 

of piercing. Either way, the treatment would be to drain this in the OR. I ex

plained the need for an incision and drainage of her right nipple abscess. I 

explained the procedure, the expected perioperative course, the need for 

dressings changes afterwards and the risks of bleeding, infection or damage to 

surrounding structures. She verbalized understanding and wishes to proceed.  I 

also encouraged her to stop smoking.

## 2021-06-21 NOTE — PCM.PREANE
Preanesthetic Assessment





- Anesthesia/Transfusion/Family Hx


Anesthesia History: Prior Anesthesia Without Reaction


Other Type of Anesthesia Reaction Comment: states "it takes awhile to get 

anesthesia out of my system"


Family History of Anesthesia Reaction: No


Transfusion History: No Prior Transfusion(s)


Intubation History: Unknown





- Review of Systems


General: Fever


Pulmonary: No Symptoms


Cardiovascular: No Symptoms


Gastrointestinal: No Symptoms


Neurological: No Symptoms


Other: Reports: None





- Physical Assessment


NPO Status Date: 06/21/21


NPO Status Time: 06:00


Vital Signs: 





                                Last Vital Signs











Temp  96.8 F L  06/21/21 06:55


 


Pulse  89   06/21/21 11:52


 


Resp  16   06/21/21 11:52


 


BP  129/80   06/21/21 11:52


 


Pulse Ox  97   06/21/21 11:52











Height: 5 ft 5 in


Weight: 220 lb


ASA Class: 2E


Mental Status: Alert & Oriented x3


Airway Class: Mallampati = 2


Dentition: Reports: Normal Dentition


ROM/Head Extension: Full


Lungs: Clear to Auscultation, Normal Respiratory Effort


Cardiovascular: Regular Rate, Regular Rhythm





- Lab


Values: 





                             Laboratory Last Values











WBC  10.41 K/uL (4.0-11.0)   06/21/21  07:27    


 


RBC  5.13 M/uL (4.30-5.90)   06/21/21  07:27    


 


Hgb  16.2 g/dL (12.0-16.0)  H  06/21/21  07:27    


 


Hct  46.6 % (36.0-46.0)  H  06/21/21  07:27    


 


MCV  90.8 fL (80.0-98.0)   06/21/21  07:27    


 


MCH  31.6 pg (27.0-32.0)   06/21/21  07:27    


 


MCHC  34.8 g/dL (31.0-37.0)   06/21/21  07:27    


 


RDW Std Deviation  43.6 fl (28.0-62.0)   06/21/21  07:27    


 


RDW Coeff of Ricarda  13 % (11.0-15.0)   06/21/21  07:27    


 


Plt Count  268 K/uL (150-400)   06/21/21  07:27    


 


MPV  9.00 fL (7.40-12.00)   06/21/21  07:27    


 


Neut % (Auto)  67.4 % (48.0-80.0)   06/21/21  07:27    


 


Lymph % (Auto)  19.3 % (16.0-40.0)   06/21/21  07:27    


 


Mono % (Auto)  7.4 % (0.0-15.0)   06/21/21  07:27    


 


Eos % (Auto)  5.6 % (0.0-7.0)   06/21/21  07:27    


 


Baso % (Auto)  0.3 % (0.0-1.5)   06/21/21  07:27    


 


Neut # (Auto)  7.0 K/uL (1.4-5.7)  H  06/21/21  07:27    


 


Lymph # (Auto)  2.0 K/uL (0.6-2.4)   06/21/21  07:27    


 


Mono # (Auto)  0.8 K/uL (0.0-0.8)   06/21/21  07:27    


 


Eos # (Auto)  0.6 K/uL (0.0-0.7)   06/21/21  07:27    


 


Baso # (Auto)  0.0 K/uL (0.0-0.1)   06/21/21  07:27    


 


Nucleated RBC %  0.0 /100WBC  06/21/21  07:27    


 


Nucleated RBCs #  0 K/uL  06/21/21  07:27    


 


INR  1.00   06/21/21  07:27    


 


Sodium  137 mmol/L (136-145)   06/21/21  07:27    


 


Potassium  4.4 mmol/L (3.5-5.1)   06/21/21  07:27    


 


Chloride  102 mmol/L ()   06/21/21  07:27    


 


Carbon Dioxide  24.1 mmol/L (21.0-32.0)   06/21/21  07:27    


 


BUN  16 mg/dL (7.0-18.0)   06/21/21  07:27    


 


Creatinine  0.9 mg/dL (0.6-1.0)   06/21/21  07:27    


 


Est Cr Clr Drug Dosing  72.53 mL/min  06/21/21  07:27    


 


Estimated GFR (MDRD)  > 60.0 ml/min  06/21/21  07:27    


 


Glucose  112 mg/dL ()  H  06/21/21  07:27    


 


Lactic Acid  1.0 mmol/L (0.4-2.0)   06/21/21  07:27    


 


Calcium  8.9 mg/dL (8.5-10.1)   06/21/21  07:27    


 


Magnesium  2.1 mg/dL (1.8-2.4)   06/21/21  07:27    


 


Total Bilirubin  0.5 mg/dL (0.2-1.0)   06/21/21  07:27    


 


AST  11 IU/L (15-37)  L  06/21/21  07:27    


 


ALT  21 IU/L (14-63)   06/21/21  07:27    


 


Alkaline Phosphatase  57 U/L ()   06/21/21  07:27    


 


Total Protein  7.6 g/dL (6.4-8.2)   06/21/21  07:27    


 


Albumin  3.7 g/dL (3.4-5.0)   06/21/21  07:27    


 


Globulin  3.9 g/dL (2.6-4.0)   06/21/21  07:27    


 


Albumin/Globulin Ratio  0.9  (0.9-1.6)   06/21/21  07:27    


 


HCG, Qual  NEGATIVE  (NEG)   06/21/21  07:27    


 


SARS-CoV-2 RNA (MARY)  NEGATIVE  (NEGATIVE)   06/21/21  07:40    














- Allergies


Allergies/Adverse Reactions: 


                                    Allergies











Allergy/AdvReac Type Severity Reaction Status Date / Time


 


No Known Allergies Allergy   Verified 06/21/21 06:58














- Blood


Blood Available: No





- Acknowledgements


Anesthesia Type Planned: General Anesthesia


Pt an Appropriate Candidate for the Planned Anesthesia: Yes


Alternatives and Risks of Anesthesia Discussed w Pt/Guardian: Yes


Pt/Guardian Understands and Agrees with Anesthesia Plan: Yes





PreAnesthesia Questionnaire


HEENT History: Reports: None


Cardiovascular History: Reports: Heart Murmur


Respiratory History: Reports: Asthma


Gastrointestinal History: Reports: GERD


Genitourinary History: Reports: None, Renal Calculus


Other Genitourinary History: hx of kidney stone


OB/GYN History: Reports: Polycystic Ovaries, Other (See Below)


Other OB/BYN History: tubal pregnancy


Musculoskeletal History: Reports: Back Pain, Chronic, Fracture, Other (See 

Below)


Other Musculoskeletal History: hx of fx left foot; Scheuermann's Disease


Neurological History: Reports: None


Psychiatric History: Reports: None


Endocrine/Metabolic History: Reports: Hypothyroidism, Obesity/BMI 30+


Hematologic History: Reports: None


Immunologic History: Reports: None


Oncologic (Cancer) History: Reports: None


Dermatologic History: Reports: None





- Infectious Disease History


Infectious Disease History: Reports: None





- Past Surgical History


Head Surgeries/Procedures: Reports: None


HEENT Surgical History: Reports: Tonsillectomy


Cardiovascular Surgical History: Reports: Other (See Below)


Other Cardiovascular Surgeries/Procedures: Angiogram


Respiratory Surgical History: Reports: None


GI Surgical History: Reports: None


Female  Surgical History: Reports: D&C


Endocrine Surgical History: Reports: None


Neurological Surgical History: Reports: None


Musculoskeletal Surgical History: Reports: None


Oncologic Surgical History: Reports: None


Dermatological Surgical History: Reports: None





- SUBSTANCE USE


Tobacco Use Within Last Twelve Months: Cigarettes


Recreational Drug Use History: No





- HOME MEDS


Home Medications: 


                                    Home Meds





Montelukast [Singulair] 10 mg PO DAILY 07/19/15 [History]


metFORMIN [Glucophage] 1,000 mg PO BID 07/19/15 [History]


Albuterol Sulfate [Proair Hfa] 2 puff INH ASDIRECTED PRN 08/01/17 [History]


Fexofenadine HCl [Allegra Allergy] 1 tab PO ASDIRECTED PRN 08/01/17 [History]


diphenhydrAMINE [Benadryl] 25 ng PO BEDTIME PRN 08/01/17 [History]


cefUROXime axetiL [Ceftin] 250 mg PO BID #20 tablet 11/16/18 [Rx]


methylPREDNISolone [Medrol] 4 mg PO ASDIRECTED #1 dosepk 11/16/18 [Rx]











- CURRENT (IN HOUSE) MEDS


Current Meds: 





                               Current Medications





Lactated Ringer's (Ringers, Lactated)  1,000 mls @ 150 mls/hr IV ASDIRECTED CHANNING


   Last Admin: 06/21/21 11:29 Dose:  150 mls/hr


   Documented by: 





Discontinued Medications





Bupivacaine HCl (Bupivacaine 0.5% 30 Ml Sdv) Confirm Administered Dose 30 ml 

.ROUTE .STK-MED ONE


   Stop: 06/21/21 11:50


Hydromorphone HCl (Hydromorphone 1 Mg/Ml Syringe)  1 mg IVPUSH ONETIME ONE


   Stop: 06/21/21 09:51


   Last Admin: 06/21/21 09:54 Dose:  1 mg


   Documented by: 


Lactated Ringer's (Ringers, Lactated)  1,000 mls @ 999 mls/hr IV .BOLUS ONE


   Stop: 06/21/21 08:19


   Last Admin: 06/21/21 07:37 Dose:  999 mls/hr


   Documented by: 


Morphine Sulfate (Morphine 4 Mg/Ml Syringe)  4 mg IVPUSH ONETIME ONE


   Stop: 06/21/21 07:20


   Last Admin: 06/21/21 07:37 Dose:  4 mg


   Documented by: 


Octyl Cyanoacrylate (Octyl 2-Cyanoacrylate 1 Tube) Confirm Administered Dose 1 

applic .ROUTE .STK-MED ONE


   Stop: 06/21/21 11:50

## 2021-08-17 NOTE — [id number]
Post Anesthesia Note





- EVALUATION WITHIN 48HRS OF ANESTHETIC


Vital Signs in Normal Range: Yes


Patient Participated in Evaluation: Yes


Respiratory Function Stable: Yes


Airway Patent: Yes


Cardiovascular Function Stable: Yes


Hydration Status Stable: Yes


Pain Control Satisfactory: Yes


Nausea and Vomiting Control Satisfactory: Yes


Mental Status Recovered: Yes


Vital Signs: 


                                Last Vital Signs











Temp  97.7 F   08/17/21 12:35


 


Pulse  93   08/17/21 13:00


 


Resp  13   08/17/21 13:00


 


BP  135/94 H  08/17/21 13:00


 


Pulse Ox  94 L  08/17/21 13:00














- COMMENTS/OBSERVATIONS


Free Text/Narrative:: 





Pt doing well post-op. VSS. No apparent anesthetic complications.





Dr. Antwan Coronado

## 2021-08-17 NOTE — OR
SURGEON:

LIYAH DOTSON MD

 

DATE OF PROCEDURE:  08/17/2021

 

PREOPERATIVE DIAGNOSIS:

Periductal fistula.

 

POSTOPERATIVE DIAGNOSES:

1. Periductal mastitis.

2. Periductal fistulas.

 

PROCEDURE PERFORMED:

Right ductal excision.

 

PRIMARY SURGEON:

Liyah Dotson MD

 

ANESTHESIA:

General LMA.

 

FLUIDS:

650 mL crystalloid.

 

ESTIMATED BLOOD LOSS:

5 mL.

 

FINDINGS:

Multiple subareolar periductal fistulas associated with periductal mastitis.

Ductal tissue excised measured 3 cm x 1.5 cm x 1 cm.  No margins associated with

case.  Chronically inflamed skin excision measuring 3 x 1 x 0.5 cm.

 

COMPLICATIONS:

Small cautery burn to skin.

 

INDICATIONS:

The patient is a 41-year-old patient who presented to my clinic a couple of

months ago with a retroareolar abscess.  She went to the operating room and had

an incision and drainage.  The wound almost fully closed when it became

reinfected.  It was drained in clinic and packed again.  Despite this, she has

become infected twice again with periductal mastitis treated with antibiotics

alone.  However, she continues to have chronic drainage from what appears to be

a periductal fistula.  I explained the only way to treat this was then to excise

this and the surrounding inflamed ducts as well as smoking cessation.  The

patient and I discussed the surgery, expected perioperative course, and the

risks including bleeding, infection, or damage to surrounding structures.  She

verbalized understanding and wishes to proceed.

 

PROCEDURE IN DETAIL:

The patient was brought in to the OR and placed on the OR table in supine

position.  A time-out was completed verifying the patient's name, age, date of

birth, allergies, and procedure to be performed.  General LMA anesthesia was

induced.  The right chest wall was prepped and draped in usual standard fashion.

Prior to the case, the patient was noted to have a small amount of purulent

drainage coming from her fistula site.  This was sent for wound culture.  She

did have some redness along the right side of the breast.  She was given IV

antibiotics before the start of the case.  I anesthetized the right areola with

0.5% Marcaine plain.  An elliptical incision was made along the superior half of

the skin areola border.  This elliptical incision included the chronically

draining wound site.  Cautery was then used to dissect down to the level of the

breast tissue.  I excised this ellipse of tissue and measured it on the back

table.  It measured 3 cm x 1 cm x 0.5 cm in size.  The nipple was then retracted

using a Alesia.  I continued my dissection underneath the nipple.  At one point,

the Alesia slipped and a small burn was made across the top of the areola.  This

was superficial.  I turned my attention back to my dissection.  To have better

retraction of the nipple itself, an interrupted stitch was made into the cut 
edge of the areola.  This was then

used as a handle to retract the skin more effectively.  The areolar tissue was

chronically inflamed.  I excised the tissue underneath the nipple.  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  One of the openings opened

at the 12 o'clock position at the nipple.  The other was located in the 3

o'clock position of the nipple.  This tissue was excised under the nipple.  Once

all the subareolar ducts were excised, the tissue was placed on the back table

and measured.  It measured 3 x 1.5 x 1 cm in size.  It was sent to Pathology.  I

explored with a fistula probe, but could not find any further ductal fistulas.

Electrocautery was used to achieve hemostasis.  I irrigated the wound copiously

with normal saline.  I closed the ductal sites on the nipple with interrupted 3-
0 vicryl sutures. I loosely attached the areola to the underlying breast

tissue with interrupted 3-0 Vicryl sutures.  Given that there were some signs of

inflammation and purulent appearing material in the ductal tissue that I

removed, I decided to close the skin with interrupted 3-0 Ethilon sutures to

allow for any drainage.  This was done circumferentially and I reapproximated

the areola to the surrounding skin.  The wound was then covered in bacitracin

and covered with 4 x 4 fluffs which were then secured in place with tape.  The

patient tolerated the procedure well, was extubated, and taken to the PACU in

stable condition.  All counts were complete and correct at the end of the case.

 

 

JENNIFER MASSEY

DD:  08/17/2021 13:02:55

DT:  08/17/2021 14:56:21

Job #:  [id number]

[id number]

## 2021-08-17 NOTE — PCM.POSTAN
POST ANESTHESIA ASSESSMENT





- MENTAL STATUS


Mental Status: Somnolent





- VITAL SIGNS


Vital Signs: 


                                Last Vital Signs











Temp  98.1 F   08/17/21 10:10


 


Pulse  78   08/17/21 10:10


 


Resp  16   08/17/21 10:10


 


BP  142/92 H  08/17/21 10:10


 


Pulse Ox  99   08/17/21 10:10














- RESPIRATORY


Respiratory Status: Respiratory Rate WNL, Airway Patent, O2 Saturation Stable





- CARDIOVASCULAR


CV Status: Pulse Rate WNL, Blood Pressure Stable





- GASTROINTESTINAL


GI Status: No Symptoms





- PAIN


Free Text/Narrative:: 





Resting comfortably





- POST OP HYDRATION


Hydration Status: Adequate & Stable

## 2021-08-17 NOTE — PCM.OPNOTE
- General Post-Op/Procedure Note


Date of Surgery/Procedure: 08/17/21


Operative Procedure(s): Right ductal excision


Findings: 





Periductal mastitis associated with periductal fistulas 


Pre Op Diagnosis: Periductal fistula


Post-Op Diagnosis: Periductal mastitis and fistulas


Anesthesia Technique: General LMA


Primary Surgeon: Liyah Dotson


Fluid Replacement, Intraop: 650


EBL in mLs: 5


Condition: Good

## 2021-08-17 NOTE — PCM.SN.2
- Free Text/Narrative


Note: 


Patient came in today to undergo a right ductal excision for a periductal 

fistula. The wound is infected today and draining pus. There is some redness 

around the area. This is the third time this has happened in the last month. I 

broadened the antibiotics to clindamycin. I explained to the patient that she 

needs to undergo surgical excision of this area regardless since it continues to

get infected. If there is a large abscess under the breast I will drain this and

attempt to debride the area. Hopefully with surgical excision and antibiotics 

this will heal and close fully. I explained that she is at risk for further 

infection which is why she will get broader spectrum antibiotics in the OR today

and go home on antibiotics. She verbalized understanding.

## 2021-08-17 NOTE — PCM.PREANE
Preanesthetic Assessment





- Procedure


Proposed Procedure: 





Right Ductal excis of breast





- Anesthesia/Transfusion/Family Hx


Anesthesia History: Prior Anesthesia Without Reaction


Other Type of Anesthesia Reaction Comment: states "it takes awhile to get anesth

esia out of my system"


Transfusion History: No Prior Transfusion(s)


Intubation History: Unknown





- Review of Systems


General: No Symptoms


Pulmonary: No Symptoms (1 PPD smoker, asthma with PRN MDI use)


Cardiovascular: No Symptoms


Gastrointestinal: No Symptoms


Neurological: No Symptoms


Other: Reports: None





- Physical Assessment


NPO Status Date: 08/16/21


NPO Status Time: 22:00


Vital Signs: 





                                Last Vital Signs











Temp  98.1 F   08/17/21 10:10


 


Pulse  78   08/17/21 10:10


 


Resp  16   08/17/21 10:10


 


BP  142/92 H  08/17/21 10:10


 


Pulse Ox  99   08/17/21 10:10











Height: 5 ft 5 in


Weight: 97.069 kg


ASA Class: 2


Mental Status: Alert & Oriented x3


Airway Class: Mallampati = 3


Dentition: Reports: Normal Dentition


ROM/Head Extension: Full


Lungs: Clear to Auscultation, Normal Respiratory Effort


Cardiovascular: Regular Rate, Regular Rhythm





- Lab


Values: 





                             Laboratory Last Values











Urine HCG, Qual  NEGATIVE  (NEGATIVE)   08/17/21  10:05    














- Allergies


Allergies/Adverse Reactions: 


                                    Allergies











Allergy/AdvReac Type Severity Reaction Status Date / Time


 


bandaids Allergy  Rash/reddne Uncoded 08/13/21 14:46





   ss  














- Acknowledgements


Anesthesia Type Planned: General Anesthesia


Pt an Appropriate Candidate for the Planned Anesthesia: Yes


Alternatives and Risks of Anesthesia Discussed w Pt/Guardian: Yes


Pt/Guardian Understands and Agrees with Anesthesia Plan: Yes





PreAnesthesia Questionnaire


HEENT History: Reports: Other (See Below)


Other HEENT History: top denture


Cardiovascular History: Reports: Heart Murmur


Respiratory History: Reports: Asthma


Gastrointestinal History: Reports: GERD


Genitourinary History: Reports: Renal Calculus


Other Genitourinary History: hx of kidney stone


OB/GYN History: Reports: Ectopic Pregnancy, Polycystic Ovaries


Musculoskeletal History: Reports: Back Pain, Chronic, Fracture, Other (See 

Below)


Other Musculoskeletal History: hx of fx left foot; Scheuermann's Disease


Neurological History: Reports: None


Psychiatric History: Reports: None


Endocrine/Metabolic History: Reports: Hypothyroidism, Obesity/BMI 30+


Other Endocrine/Metabolic History: hypothyroidism in the past, no longer on 

medication


Hematologic History: Reports: None


Immunologic History: Reports: None


Oncologic (Cancer) History: Reports: None


Dermatologic History: Reports: None





- Infectious Disease History


Infectious Disease History: Reports: None





- Past Surgical History


Head Surgeries/Procedures: Reports: None


HEENT Surgical History: Reports: Tonsillectomy


Cardiovascular Surgical History: Reports: Other (See Below)


Other Cardiovascular Surgeries/Procedures: Angiogram


Respiratory Surgical History: Reports: None


GI Surgical History: Reports: None


Female  Surgical History: Reports: D&C, Other (See Below)


Other Female  Surgeries/Procedures: laparoscopy for tubal pregnancy, hx 

uterine surgery for removal of mass, I&D rt breast abscess on 6/21/21


Endocrine Surgical History: Reports: None


Neurological Surgical History: Reports: None


Musculoskeletal Surgical History: Reports: None


Oncologic Surgical History: Reports: None


Dermatological Surgical History: Reports: None





- SUBSTANCE USE


Tobacco Use Status *Q: Current Every Day Tobacco User


Tobacco Use Within Last Twelve Months: Cigarettes





- HOME MEDS


Home Medications: 


                                    Home Meds





Montelukast [Singulair] 10 mg PO DAILY 07/19/15 [History]


Albuterol Sulfate [Proair Hfa] 2 puff INH ASDIRECTED PRN 08/01/17 [History]


diphenhydrAMINE [Benadryl] 25 ng PO BEDTIME PRN 08/01/17 [History]


Diclofenac Sodium 50 mg PO BID 08/13/21 [History]











- CURRENT (IN HOUSE) MEDS


Current Meds: 





                               Current Medications





Lactated Ringer's (Ringers, Lactated)  1,000 mls @ 125 mls/hr IV ASDIRECTED [facility]


   Last Admin: 08/17/21 10:35 Dose:  125 mls/hr


   Documented by: 


Sodium Chloride (Sodium Chloride 0.9% 2.5 Ml Syringe)  2.5 ml FLUSH ASDIRECTED 

PRN


   PRN Reason: Keep Vein Open


Sodium Chloride (Sodium Chloride 0.9% 10 Ml Sdv)  10 ml IV ASDIRECTED PRN


   PRN Reason: IV Use


Sodium Chloride (Sodium Chloride 0.9% 10 Ml Syringe)  10 ml FLUSH ASDIRECTED PRN


   PRN Reason: Keep Vein Open





Discontinued Medications





Bupivacaine HCl (Bupivacaine 0.5% 10 Ml Sdv) Confirm Administered Dose 20 ml 

.ROUTE .STK-MED ONE


   Stop: 08/17/21 10:40


Cefazolin Sodium/Dextrose 2 gm (/ Premix)  50 mls @ 100 mls/hr IV ONETIME ONE


   Stop: 08/16/21 13:30


Lidocaine HCl (Lidocaine 1% 20 Ml Mdv) Confirm Administered Dose 20 ml .ROUTE 

.STK-MED ONE


   Stop: 08/17/21 10:40


Octyl Cyanoacrylate (Octyl 2-Cyanoacrylate 1 Tube) Confirm Administered Dose 1 

applic .ROUTE .STK-MED ONE


   Stop: 08/17/21 10:41